# Patient Record
Sex: FEMALE | Race: OTHER | Employment: OTHER | URBAN - METROPOLITAN AREA
[De-identification: names, ages, dates, MRNs, and addresses within clinical notes are randomized per-mention and may not be internally consistent; named-entity substitution may affect disease eponyms.]

---

## 2020-03-03 ENCOUNTER — HOSPITAL ENCOUNTER (EMERGENCY)
Age: 70
Discharge: HOME | End: 2020-03-03
Attending: EMERGENCY MEDICINE
Payer: MEDICARE

## 2020-03-03 VITALS
RESPIRATION RATE: 16 BRPM | TEMPERATURE: 98.2 F | SYSTOLIC BLOOD PRESSURE: 149 MMHG | HEIGHT: 64 IN | OXYGEN SATURATION: 100 % | BODY MASS INDEX: 33.8 KG/M2 | WEIGHT: 198 LBS | DIASTOLIC BLOOD PRESSURE: 63 MMHG | HEART RATE: 61 BPM

## 2020-03-03 PROCEDURE — 6370000000 HC RX 637 (ALT 250 FOR IP): Performed by: PHYSICIAN ASSISTANT

## 2020-03-03 PROCEDURE — 99282 EMERGENCY DEPT VISIT SF MDM: CPT

## 2020-03-03 RX ORDER — PREDNISONE 20 MG/1
20 TABLET ORAL ONCE
Status: COMPLETED | OUTPATIENT
Start: 2020-03-03 | End: 2020-03-03

## 2020-03-03 RX ORDER — FAMOTIDINE 20 MG/1
20 TABLET, FILM COATED ORAL 2 TIMES DAILY
Qty: 60 TABLET | Refills: 0 | Status: SHIPPED | OUTPATIENT
Start: 2020-03-03

## 2020-03-03 RX ORDER — EPINEPHRINE 0.3 MG/.3ML
0.3 INJECTION SUBCUTANEOUS ONCE
Qty: 1 EACH | Refills: 0 | Status: SHIPPED | OUTPATIENT
Start: 2020-03-03 | End: 2020-03-03

## 2020-03-03 RX ORDER — LEVOTHYROXINE SODIUM 50 UG/1
CAPSULE ORAL
COMMUNITY

## 2020-03-03 RX ORDER — FEXOFENADINE HYDROCHLORIDE 60 MG/1
60 TABLET, FILM COATED ORAL
COMMUNITY

## 2020-03-03 RX ORDER — FAMOTIDINE 20 MG/1
20 TABLET, FILM COATED ORAL ONCE
Status: COMPLETED | OUTPATIENT
Start: 2020-03-03 | End: 2020-03-03

## 2020-03-03 RX ORDER — GREEN TEA/HOODIA GORDONII 315-12.5MG
CAPSULE ORAL
COMMUNITY

## 2020-03-03 RX ORDER — PREDNISONE 10 MG/1
20 TABLET ORAL DAILY
Qty: 10 TABLET | Refills: 0 | Status: SHIPPED | OUTPATIENT
Start: 2020-03-03 | End: 2020-03-08

## 2020-03-03 RX ORDER — DIPHENHYDRAMINE HCL 25 MG
25 TABLET ORAL ONCE
Status: COMPLETED | OUTPATIENT
Start: 2020-03-03 | End: 2020-03-03

## 2020-03-03 RX ADMIN — FAMOTIDINE 20 MG: 20 TABLET, FILM COATED ORAL at 12:12

## 2020-03-03 RX ADMIN — DIPHENHYDRAMINE HCL 25 MG: 25 TABLET ORAL at 12:12

## 2020-03-03 RX ADMIN — PREDNISONE 20 MG: 20 TABLET ORAL at 12:12

## 2020-03-03 ASSESSMENT — ENCOUNTER SYMPTOMS
VOMITING: 0
VOICE CHANGE: 0
ABDOMINAL DISTENTION: 0
SINUS PAIN: 0
RHINORRHEA: 0
COLOR CHANGE: 0
BACK PAIN: 0
COUGH: 0
SORE THROAT: 0
SHORTNESS OF BREATH: 0
CONSTIPATION: 0
ABDOMINAL PAIN: 0
WHEEZING: 0
STRIDOR: 0
SINUS PRESSURE: 0
NAUSEA: 0
DIARRHEA: 0
TROUBLE SWALLOWING: 0
FACIAL SWELLING: 1

## 2020-03-03 NOTE — ED NOTES
Discharge instructions discussed with pt; verbalized understanding. Discussed all new medications use (prednisone, pepcid, epinephrine) and side effects; verbalized understanding. Discussed follow up with PCP; verbalized understanding. All questions answered. Pt d/c home with all of belongings.         Geneva Stack RN  03/03/20 8665

## 2020-03-03 NOTE — ED PROVIDER NOTES
[facility]        Pt Name: Chato Campo  MRN: [id number]  [place] 1950  Date of evaluation: 3/3/2020  Provider: Jian Jaramillo PA-C  PCP: No primary care provider on file. Shared Visit or Autonomous Visit:  I have seen and evaluated this patient with my supervising physician Yasemin Mackay MD.    CHIEF COMPLAINT       Chief Complaint   Patient presents with    Allergic Reaction     pt states she drank a health drink called Plexis - unsure what was in drink- states she has a lot of food sensitivity- with c/o tongue numbness, facial numbness that comes and goes- took Benadryl at 1045       HISTORY OF PRESENT ILLNESS   (Location, Timing/Onset, Context/Setting, Quality, Duration, Modifying Factors, Severity, Associated Signs and Symptoms)  Note limiting factors. Chato Campo is a 77 y.o. female who presents to the emergency department with perioral and intraoral tingling sensation and tongue swelling after drinking a \"health drink\" to help lose weight called \"plexis\". Did have ingredients in it consistent with fruit extracts. Patient has multiple food allergies. She drank the drink a little bit for 1000. She noticed the symptoms shortly afterwards. Around 1045 she took 25 mg tablet of Benadryl and she is feeling much better. The tongue no longer feels swollen. She is no longer actively having any paresthesias at this time. Denies facial droop, slurred speech, headache, dizziness lightheadedness, sore throat difficulty swallowing, chest pain, shortness of breath, palpitations, hemoptysis, rash, abdominal pain, nausea vomiting, diarrhea. Nursing Notes were all reviewed and agreed with or any disagreements were addressed in the HPI. REVIEW OF SYSTEMS    (2-9 systems for level 4, 10 or more for level 5)     Review of Systems   Constitutional: Negative for chills and fever.    HENT: Positive for facial swelling (tongue). Negative for congestion, dental problem, drooling, ear discharge, ear pain, hearing loss, mouth sores, nosebleeds, postnasal drip, rhinorrhea, sinus pressure, sinus pain, sneezing, sore throat, tinnitus, trouble swallowing and voice change. Eyes: Negative for visual disturbance. Respiratory: Negative for cough, shortness of breath, wheezing and stridor. Cardiovascular: Negative for chest pain, palpitations and leg swelling. Gastrointestinal: Negative for abdominal distention, abdominal pain, constipation, diarrhea, nausea and vomiting. Endocrine: Negative. Genitourinary: Negative. Musculoskeletal: Negative for back pain, neck pain and neck stiffness. Skin: Negative for color change, pallor, rash and wound. Neurological: Positive for numbness. Negative for dizziness, tremors, seizures, syncope, facial asymmetry, speech difficulty, weakness, light-headedness and headaches. Psychiatric/Behavioral: Negative for confusion. All other systems reviewed and are negative. Positives and Pertinent negatives as per HPI. Except as noted above in the ROS, all other systems were reviewed and negative. PAST MEDICAL HISTORY     Past Medical History:   Diagnosis Date    Palpitations          SURGICAL HISTORY     Past Surgical History:   Procedure Laterality Date     SECTION      HYSTERECTOMY           CURRENTMEDICATIONS       Previous Medications    CHOLECALCIFEROL (VITAMIN D) 2000 UNITS CAPS CAPSULE    Take  by mouth. FEXOFENADINE (ALLEGRA) 60 MG TABLET    Take 60 mg by mouth    LACTOBACILLUS (PROBIOTIC ACIDOPHILUS) TABS    Take by mouth    LEVOTHYROXINE SODIUM 50 MCG CAPS    Take by mouth    METOPROLOL (TOPROL-XL) 50 MG XL TABLET    Take 50 mg by mouth daily. ALLERGIES     Dairycare [lactase-lactobacillus]; Demerol; Levofloxacin; Macadamia nut oil; Macrobid [nitrofurantoin monohydrate macrocrystals]; Morphine; Penicillins; Psyllium; Raspberry;  Chocolate; and Peanut butter flavor    FAMILYHISTORY     History reviewed. No pertinent family history. SOCIAL HISTORY       Social History     Tobacco Use    Smoking status: Never Smoker    Smokeless tobacco: Never Used   Substance Use Topics    Alcohol use: No    Drug use: Not Currently       SCREENINGS             PHYSICAL EXAM    (up to 7 for level 4, 8 or more for level 5)     ED Triage Vitals [20 1148]   BP Temp Temp Source Pulse Resp SpO2 Height Weight   (!) 149/63 98.2 °F (36.8 °C) Oral 61 16 100 % 5' 4\" (1.626 m) 198 lb (89.8 kg)       Physical Exam  Vitals signs and nursing note reviewed. Constitutional:       Appearance: Normal appearance. She is well-developed. She is not toxic-appearing or diaphoretic. HENT:      Head: Normocephalic and atraumatic. Jaw: There is normal jaw occlusion. Salivary Glands: Right salivary gland is not diffusely enlarged or tender. Left salivary gland is not diffusely enlarged or tender. Right Ear: Tympanic membrane, ear canal and external ear normal.      Left Ear: Tympanic membrane, ear canal and external ear normal.      Nose: Nose normal.      Mouth/Throat:      Lips: Pink. Mouth: Mucous membranes are moist.      Dentition: No dental tenderness. Tongue: No lesions. Tongue does not protrude in midline. Palate: No mass and lesions. Palate does not elevate in midline. Pharynx: Oropharynx is clear. Uvula midline. Tonsils: No tonsillar exudate or tonsillar abscesses. Swellin+ on the right. 1+ on the left. Eyes:      General: No scleral icterus. Right eye: No discharge. Left eye: No discharge. Extraocular Movements: Extraocular movements intact. Conjunctiva/sclera: Conjunctivae normal.      Pupils: Pupils are equal, round, and reactive to light. Neck:      Musculoskeletal: Normal range of motion and neck supple. Cardiovascular:      Rate and Rhythm: Normal rate.    Pulmonary:      Effort: Pulmonary m)       Patient was given the following medications:  Medications   diphenhydrAMINE (BENADRYL) tablet 25 mg (25 mg Oral Given 3/3/20 1212)   famotidine (PEPCID) tablet 20 mg (20 mg Oral Given 3/3/20 1212)   predniSONE (DELTASONE) tablet 20 mg (20 mg Oral Given 3/3/20 1212)       This patient presents with transient perioral and intraoral paresthesias and tongue swelling after drinking a new health beverage. She does have multiple food allergies. She is advised to discontinue this drink and be mindful of what she ingests. She will be sent home with antihistamine and EpiPen as needed for severe allergic reaction. She is feeling much better after taking a Benadryl earlier today. She was given steroid and antihistamines here and tolerated this orally without immediate complications or emesis. My suspicion is low for severe allergic reaction, acute respiratory distress, acute strep pharyngitis, peritonsillar or tonsillar abscess, retropharyngeal abscess, bacterial tracheitis, epiglottitis, meningitis, encephalitis, foreign body, angioedema, anaphylaxis, mononucleosis, mumps, lymphoma, leukemia, asthma exacerbation, osteomyelitis, mastoiditis, sepsis, DKA, trench mouth, dental abscess, Bill angina, or other concerning pathology. We have addressed concerns and expectations. FINAL IMPRESSION      1.  Allergic reaction, initial encounter          DISPOSITION/PLAN   DISPOSITION Decision To Discharge 03/03/2020 12:08:35 PM      PATIENT REFERREDTO:  see your PCP in 1-3 days          [facility] Emergency Department  [address] [facility]  [phone]    If symptoms worsen      DISCHARGE MEDICATIONS:  New Prescriptions    EPINEPHRINE (EPIPEN 2-HUGH) 0.3 MG/0.3ML SOAJ INJECTION    Inject 0.3 mLs into the muscle once for 1 dose Use as directed for allergic reaction    FAMOTIDINE (PEPCID) 20 MG TABLET    Take 1 tablet by mouth 2 times daily       DISCONTINUED MEDICATIONS:  Discontinued Medications    No medications on file              (Please note that portions of this note were completed with a voice recognition program.  Efforts were made to edit the dictations but occasionally words are mis-transcribed.)    Itz Kurtz PA-C (electronically signed)            Itz Kurtz PA-C  03/03/20 0511

## 2020-03-03 NOTE — ED PROVIDER NOTES
As physician-in-triage, I performed a medical screening history and physical exam on Tha Randle. I also cared for and evaluated the patient in conjunction with the ED Advanced Practice Provider. All diagnostic, treatment, and disposition decisions were made by myself in conjunction with the advanced practice provider. For all further details of the patient's emergency department visit, please see the advanced practice provider's documentation. Patient presents the ER for evaluation of allergic reaction without airway compromise, she is some subjective tongue paresthesias subsequently resolved, she is had no decline in her condition over the 2 and half hours post event, she has no stridor no trismus, no angioedema, no urticaria. Signs and symptoms of anaphylaxis anaphylactoid were discussed with the patient. She is will be prescribed an EpiPen antihistamines and steroids she is return if she is worse or new symptoms. She verbalized understanding of this.     Impression: Allergic reaction     Amber Guan MD  29/35/59 [id number]

## 2025-01-30 ENCOUNTER — APPOINTMENT (OUTPATIENT)
Dept: ENDOSCOPY | Age: 75
End: 2025-01-30
Attending: INTERNAL MEDICINE
Payer: MEDICARE

## 2025-01-30 ENCOUNTER — HOSPITAL ENCOUNTER (OUTPATIENT)
Age: 75
Setting detail: OUTPATIENT SURGERY
Discharge: HOME | End: 2025-01-30
Attending: INTERNAL MEDICINE | Admitting: INTERNAL MEDICINE
Payer: MEDICARE

## 2025-01-30 ENCOUNTER — ANESTHESIA (OUTPATIENT)
Dept: ENDOSCOPY | Age: 75
End: 2025-01-30
Payer: MEDICARE

## 2025-01-30 ENCOUNTER — ANESTHESIA EVENT (OUTPATIENT)
Dept: ENDOSCOPY | Age: 75
End: 2025-01-30
Payer: MEDICARE

## 2025-01-30 VITALS
HEIGHT: 64 IN | WEIGHT: 188 LBS | RESPIRATION RATE: 15 BRPM | BODY MASS INDEX: 32.1 KG/M2 | SYSTOLIC BLOOD PRESSURE: 127 MMHG | DIASTOLIC BLOOD PRESSURE: 65 MMHG | HEART RATE: 75 BPM | TEMPERATURE: 97.7 F | OXYGEN SATURATION: 100 %

## 2025-01-30 DIAGNOSIS — Z12.11 COLON CANCER SCREENING: ICD-10-CM

## 2025-01-30 DIAGNOSIS — K58.1 IRRITABLE BOWEL SYNDROME WITH CONSTIPATION: ICD-10-CM

## 2025-01-30 PROCEDURE — 88305 TISSUE EXAM BY PATHOLOGIST: CPT

## 2025-01-30 PROCEDURE — 3700000001 HC ADD 15 MINUTES (ANESTHESIA): Performed by: INTERNAL MEDICINE

## 2025-01-30 PROCEDURE — 6360000002 HC RX W HCPCS

## 2025-01-30 PROCEDURE — 7100000011 HC PHASE II RECOVERY - ADDTL 15 MIN: Performed by: INTERNAL MEDICINE

## 2025-01-30 PROCEDURE — 2709999900 HC NON-CHARGEABLE SUPPLY: Performed by: INTERNAL MEDICINE

## 2025-01-30 PROCEDURE — 3700000000 HC ANESTHESIA ATTENDED CARE: Performed by: INTERNAL MEDICINE

## 2025-01-30 PROCEDURE — 2580000003 HC RX 258: Performed by: ANESTHESIOLOGY

## 2025-01-30 PROCEDURE — 7100000010 HC PHASE II RECOVERY - FIRST 15 MIN: Performed by: INTERNAL MEDICINE

## 2025-01-30 PROCEDURE — 3609010600 HC COLONOSCOPY POLYPECTOMY SNARE/COLD BIOPSY: Performed by: INTERNAL MEDICINE

## 2025-01-30 RX ORDER — ACETAMINOPHEN 500 MG
500 TABLET ORAL EVERY 6 HOURS PRN
COMMUNITY

## 2025-01-30 RX ORDER — DILTIAZEM HCL 60 MG
60 TABLET ORAL PRN
COMMUNITY

## 2025-01-30 RX ORDER — NITROGLYCERIN 0.3 MG/1
0.3 TABLET SUBLINGUAL EVERY 5 MIN PRN
COMMUNITY

## 2025-01-30 RX ORDER — PROPOFOL 10 MG/ML
INJECTION, EMULSION INTRAVENOUS
Status: DISCONTINUED | OUTPATIENT
Start: 2025-01-30 | End: 2025-01-30 | Stop reason: SDUPTHER

## 2025-01-30 RX ORDER — LIDOCAINE HYDROCHLORIDE 20 MG/ML
INJECTION, SOLUTION INTRAVENOUS
Status: DISCONTINUED | OUTPATIENT
Start: 2025-01-30 | End: 2025-01-30 | Stop reason: SDUPTHER

## 2025-01-30 RX ORDER — SODIUM CHLORIDE, SODIUM LACTATE, POTASSIUM CHLORIDE, CALCIUM CHLORIDE 600; 310; 30; 20 MG/100ML; MG/100ML; MG/100ML; MG/100ML
INJECTION, SOLUTION INTRAVENOUS CONTINUOUS
Status: DISCONTINUED | OUTPATIENT
Start: 2025-01-30 | End: 2025-01-30 | Stop reason: HOSPADM

## 2025-01-30 RX ORDER — CYANOCOBALAMIN 1000 UG/ML
1000 INJECTION, SOLUTION INTRAMUSCULAR; SUBCUTANEOUS ONCE
COMMUNITY

## 2025-01-30 RX ADMIN — LIDOCAINE HYDROCHLORIDE 80 MG: 20 INJECTION, SOLUTION INTRAVENOUS at 09:54

## 2025-01-30 RX ADMIN — SODIUM CHLORIDE, POTASSIUM CHLORIDE, SODIUM LACTATE AND CALCIUM CHLORIDE: 600; 310; 30; 20 INJECTION, SOLUTION INTRAVENOUS at 08:51

## 2025-01-30 RX ADMIN — PROPOFOL 150 MCG/KG/MIN: 10 INJECTION, EMULSION INTRAVENOUS at 09:55

## 2025-01-30 RX ADMIN — PROPOFOL 50 MG: 10 INJECTION, EMULSION INTRAVENOUS at 09:54

## 2025-01-30 ASSESSMENT — PAIN SCALES - GENERAL
PAINLEVEL_OUTOF10: 0

## 2025-01-30 ASSESSMENT — PAIN - FUNCTIONAL ASSESSMENT: PAIN_FUNCTIONAL_ASSESSMENT: 0-10

## 2025-01-30 NOTE — PROGRESS NOTES
Ambulatory Surgery/Procedure Discharge Note    Vitals:    01/30/25 1040   BP: 127/65   Pulse: 75   Resp: 15   Temp:    SpO2: 100%       No intake/output data recorded. Pt drank soda    Restroom use offered before discharge.  Yes    Pain assessment:  none  Pain Level: 0    Pt returned to Endo recovery s/p colonoscopy.  By discharge, pt is alert; speech clear; breathing easily on RA; denies any pain; drank soda and tolerated well.  Dr. Archer came to bedside to talk with pt and  [place].      Discharge instructions discussed with pt and ,and both verbalized understanding. IV removed, and dressing applied.    Patient discharged to home/self care. Patient did not want wheelchair, and walked with steady gait with this RN at her side to entrance to waiting family/S.O.       1/30/2025 10:52 AM

## 2025-01-30 NOTE — ANESTHESIA POSTPROCEDURE EVALUATION
Department of Anesthesiology  Postprocedure Note    Patient: Maricarmen Reyes  MRN: [id number]  YOB: 1950  Date of evaluation: 1/30/2025    Procedure Summary       Date: 01/30/25 Room / Location: [place] [address] / [facility]    Anesthesia Start: 0948 Anesthesia Stop: 1016    Procedure: COLONOSCOPY POLYPECTOMY SNARE/BIOPSY Diagnosis:       Colon cancer screening      Irritable bowel syndrome with constipation      (Colon cancer screening [Z12.11])      (Irritable bowel syndrome with constipation [K58.1])    Surgeons: Shelley Archer MD Responsible Provider: Anibal Perry MD    Anesthesia Type: MAC ASA Status: 2            Anesthesia Type: No value filed.    Missy Phase I: Missy Score: 10    Missy Phase II: Missy Score: 10    Anesthesia Post Evaluation    Patient location during evaluation: PACU  Patient participation: complete - patient participated  Level of consciousness: awake  Airway patency: patent  Nausea & Vomiting: no nausea and no vomiting  Cardiovascular status: blood pressure returned to baseline and hemodynamically stable  Respiratory status: acceptable  Hydration status: euvolemic  Multimodal analgesia pain management approach  Pain management: adequate    No notable events documented.

## 2025-01-30 NOTE — DISCHARGE INSTRUCTIONS
ENDOSCOPY DISCHARGE INSTRUCTIONS:    Call the physician that did your procedure for any questions or concern:    [facility]: [phone]  DR. DENILSON JOHNSON        ACTIVITY:    There are potential side effects to the medications used for sedation and anesthesia during your procedure.  These include:  Dizziness or light-headedness, confusion or memory loss, delayed reaction times, loss of coordination, nausea and vomiting.  Because of your increased risk for injury, we ask that you observe the following precautions:  For the next 24 hours,  DO NOT operate an automobile, bicycle, motorcycle, , power tools or large equipment of any kind.  Do not drink alcohol, sign any legal documents or make any legal decisions for 24 hours.  Do not bend your head over lower than your heart.  DO sit on the side of bed/couch awhile before getting up.  Plan on bedrest or quiet relaxation today.  You may resume normal activities in 24 hours.    DIET:    Your first meal today should be light, avoiding spicy and fatty foods.  If you tolerate this first meal, then you may advance to your regular diet unless otherwise advised by your physician.    NORMAL SYMPTOMS:  -Mild sore throat if you’ve had an EGD   -Gaseous discomfort    NOTIFY YOUR PHYSICIAN IF THESE SYMPTOMS OCCUR:  1. Fever (greater than 100)  5. Increased abdominal bloating  2. Severe pain    6. Excessive bleeding  3. Nausea and vomiting  7. Chest pain                                                                    4. Chills    8. Shortness of breath    ADDITIONAL INSTRUCTIONS:    Biopsy results: Call [facility] for biopsy results in 1 week    Educational Information:    Impression:    - A 10 mm polyp removed by cold snare polypectomy from the ascending colon.    - Mucosa throughout the colon and terminal ileum was otherwise normal.  Small to medium internal hemorrhoids.     Recommendations:  Await pathology.  Repeat colonoscopy in 3 years.  High-fiber

## 2025-01-30 NOTE — H&P
Gastroenterology Note             Pre-operative History and Physical    Patient: Maricarmen Reyes  : 1950  CSN: [id number]    History Obtained From:  patient and/or guardian.     HISTORY OF PRESENT ILLNESS:    The patient is a 74 y.o. female  here for colon cancer surveillance.      Past Medical History:    Past Medical History:   Diagnosis Date    Palpitations     Small vessel disease (HCC)     Tachycardia      Past Surgical History:    Past Surgical History:   Procedure Laterality Date    APPENDECTOMY      BREAST LUMPECTOMY       SECTION      COLONOSCOPY      HYSTERECTOMY (CERVIX STATUS UNKNOWN)      OVARIAN CYST SURGERY       Medications Prior to Admission:   No current facility-administered medications on file prior to encounter.     Current Outpatient Medications on File Prior to Encounter   Medication Sig Dispense Refill    Glutamine 500 MG TABS Take 850 mg by mouth      cyanocobalamin 1000 MCG/ML injection Inject 1 mL into the muscle once      dilTIAZem (CARDIZEM) 60 MG tablet Take 1 tablet by mouth as needed      acetaminophen (TYLENOL) 500 MG tablet Take 1 tablet by mouth every 6 hours as needed for Pain      Levothyroxine Sodium 50 MCG CAPS Take by mouth      fexofenadine (ALLEGRA) 60 MG tablet Take 1 tablet by mouth      Collagen-Vitamin C-Biotin (COLLAGEN PO) by Other route 2 tablespoons      nitroGLYCERIN (NITROSTAT) 0.3 MG SL tablet Place 1 tablet under the tongue every 5 minutes as needed for Chest pain up to max of 3 total doses. If no relief after 1 dose, call 911.      Lactobacillus (PROBIOTIC ACIDOPHILUS) TABS Take by mouth      EPINEPHrine (EPIPEN 2-HUGH) 0.3 MG/0.3ML SOAJ injection Inject 0.3 mLs into the muscle once for 1 dose Use as directed for allergic reaction 1 each 0    Cholecalciferol (VITAMIN D) 2000 UNITS CAPS capsule Take  by mouth.            Allergies:  Iodinated contrast media, Dairycare [bacid], Demerol, Levofloxacin, Macrobid [nitrofurantoin monohydrate

## 2025-01-30 NOTE — ANESTHESIA PRE PROCEDURE
Department of Anesthesiology  Preprocedure Note       Name:  Maricarmen Reyes   Age:  74 y.o.  :  1950                                          MRN:  [id number]         Date:  2025      Surgeon: Surgeon(s):  Shelley Archer MD    Procedure: Procedure(s):  COLONOSCOPY    Medications prior to admission:   Prior to Admission medications    Medication Sig Start Date End Date Taking? Authorizing Provider   Levothyroxine Sodium 50 MCG CAPS Take by mouth    Dali Ludwig MD   fexofenadine (ALLEGRA) 60 MG tablet Take 60 mg by mouth    Dali Ludwig MD   Lactobacillus (PROBIOTIC ACIDOPHILUS) TABS Take by mouth    Dali Ludwig MD   famotidine (PEPCID) 20 MG tablet Take 1 tablet by mouth 2 times daily 3/3/20   Sae Griffith PA-C   EPINEPHrine (EPIPEN 2-[facility]) 0.3 MG/0.3ML SOAJ injection Inject 0.3 mLs into the muscle once for 1 dose Use as directed for allergic reaction 3/3/20 3/3/20  Sae Griffith PA-C   metoprolol (TOPROL-XL) 50 MG XL tablet Take 50 mg by mouth daily.      ProviderDali MD   Cholecalciferol (VITAMIN D) 2000 UNITS CAPS capsule Take  by mouth.      ProviderDali MD       Current medications:    No current facility-administered medications for this encounter.       Allergies:    Allergies   Allergen Reactions   • Dairycare [Bacid]      Allergy to dairy   • Demerol    • Levofloxacin    • Macadamia Nut Oil    • Macrobid [Nitrofurantoin Monohydrate Macrocrystals]    • Morphine    • Penicillins    • Psyllium    • Raspberry Other (See Comments)     unknown     • Chocolate Palpitations   • Peanut Butter Flavoring Agent (Non-Screening) Palpitations       Problem List:    Patient Active Problem List   Diagnosis Code   • Obesity E66.9   • Palpitations R00.2       Past Medical History:        Diagnosis Date   • Palpitations        Past Surgical History:        Procedure Laterality Date   •  SECTION     • HYSTERECTOMY         Social History:

## 2025-01-30 NOTE — PROCEDURES
PROCEDURE NOTE  Date: 2025   Name: Maricarmen Reyes  YOB: 1950    Procedures        Colonoscopy Procedure  Note          Patient: Maricarmen Reyes  : 1950  CRN:  [unfilled]    Procedure: Colonoscopy with polypectomy (cold snare)    Date:  2025    Surgeon:  Shelley Archer MD, MD    Referring Physician:  Cami Calvillo MD    Preoperative Diagnosis: Colon cancer screen    Postoperative Diagnosis: A 10 mm polyp removed by cold snare polypectomy from the ascending colon.  Mucosa throughout the colon and terminal ileum was otherwise normal.  Small to medium internal hemorrhoids.    Anesthesia:  MAC    EBL: Minimal to none.    Indications: This is a 74 y.o. year old female who presents today with screening for colon cancer.      Procedure:   An informed consent was obtained from the patient after explanation of indications, benefits, possible risks and complications of the procedure.  The patient was then taken to the endoscopy suite, placed in the left lateral decubitus position, and the above IV anesthesia was administered.      Digital rectal examination was performed.  With the patient in the left lateral decubitus position the endoscope was inserted through the anorectal area into the rectum. The scope was then advanced through the length of the colon to the terminal ileum.  The quality of preparation was adequate.  The scope was carefully withdrawn and the mucosa was fully inspected including retroflexion in the rectum. Findings and interventions are described below.      The patient tolerated the procedure well and was taken to the PACU in good condition.  There were no immediate complications.      Impression:    - A 10 mm polyp removed by cold snare polypectomy from the ascending colon.    - Mucosa throughout the colon and terminal ileum was otherwise normal.  Small to medium internal hemorrhoids.      Recommendations:  Await pathology.  Repeat colonoscopy in 3 years.  High-fiber

## (undated) DEVICE — SNARE COLD DIAMOND 10MM THIN

## (undated) DEVICE — TRAP SPEC RETRV CLR PLAS POLYP IN LN SUCT QUIK CTCH